# Patient Record
Sex: MALE | Race: WHITE | URBAN - METROPOLITAN AREA
[De-identification: names, ages, dates, MRNs, and addresses within clinical notes are randomized per-mention and may not be internally consistent; named-entity substitution may affect disease eponyms.]

---

## 2019-09-14 ENCOUNTER — HOSPITAL ENCOUNTER (INPATIENT)
Age: 39
LOS: 4 days | Discharge: HOME | DRG: 885 | End: 2019-09-18
Attending: PSYCHIATRY & NEUROLOGY | Admitting: PSYCHIATRY & NEUROLOGY
Payer: MEDICARE

## 2019-09-14 PROBLEM — F31.9 BIPOLAR 1 DISORDER (HCC): Status: ACTIVE | Noted: 2019-09-14

## 2019-09-14 PROCEDURE — 1240000000 HC EMOTIONAL WELLNESS R&B

## 2019-09-14 PROCEDURE — 99222 1ST HOSP IP/OBS MODERATE 55: CPT | Performed by: INTERNAL MEDICINE

## 2019-09-14 RX ORDER — ACETAMINOPHEN 325 MG/1
650 TABLET ORAL EVERY 4 HOURS PRN
Status: DISCONTINUED | OUTPATIENT
Start: 2019-09-14 | End: 2019-09-18 | Stop reason: HOSPADM

## 2019-09-14 RX ORDER — PREDNISONE 20 MG/1
20 TABLET ORAL DAILY
Status: DISCONTINUED | OUTPATIENT
Start: 2019-09-15 | End: 2019-09-18 | Stop reason: HOSPADM

## 2019-09-14 RX ORDER — BENZTROPINE MESYLATE 1 MG/ML
2 INJECTION INTRAMUSCULAR; INTRAVENOUS DAILY PRN
Status: DISCONTINUED | OUTPATIENT
Start: 2019-09-14 | End: 2019-09-18 | Stop reason: HOSPADM

## 2019-09-14 RX ORDER — CLINDAMYCIN HYDROCHLORIDE 150 MG/1
300 CAPSULE ORAL EVERY 8 HOURS SCHEDULED
Status: DISCONTINUED | OUTPATIENT
Start: 2019-09-14 | End: 2019-09-18 | Stop reason: HOSPADM

## 2019-09-14 RX ORDER — HYDROXYZINE HYDROCHLORIDE 25 MG/1
25 TABLET, FILM COATED ORAL 3 TIMES DAILY PRN
Status: DISCONTINUED | OUTPATIENT
Start: 2019-09-14 | End: 2019-09-18 | Stop reason: HOSPADM

## 2019-09-14 RX ORDER — DIPHENHYDRAMINE HYDROCHLORIDE, ZINC ACETATE 2; .1 G/100G; G/100G
CREAM TOPICAL 3 TIMES DAILY PRN
Status: DISCONTINUED | OUTPATIENT
Start: 2019-09-14 | End: 2019-09-18 | Stop reason: HOSPADM

## 2019-09-14 RX ORDER — TRAZODONE HYDROCHLORIDE 50 MG/1
50 TABLET ORAL NIGHTLY PRN
Status: DISCONTINUED | OUTPATIENT
Start: 2019-09-14 | End: 2019-09-18 | Stop reason: HOSPADM

## 2019-09-14 RX ORDER — MAGNESIUM HYDROXIDE/ALUMINUM HYDROXICE/SIMETHICONE 120; 1200; 1200 MG/30ML; MG/30ML; MG/30ML
30 SUSPENSION ORAL 3 TIMES DAILY PRN
Status: DISCONTINUED | OUTPATIENT
Start: 2019-09-14 | End: 2019-09-18 | Stop reason: HOSPADM

## 2019-09-14 ASSESSMENT — PAIN DESCRIPTION - PAIN TYPE: TYPE: ACUTE PAIN

## 2019-09-14 ASSESSMENT — SLEEP AND FATIGUE QUESTIONNAIRES
DIFFICULTY FALLING ASLEEP: YES
SLEEP PATTERN: DIFFICULTY FALLING ASLEEP;DISTURBED/INTERRUPTED SLEEP;RESTLESSNESS
RESTFUL SLEEP: NO
AVERAGE NUMBER OF SLEEP HOURS: 4
DIFFICULTY STAYING ASLEEP: YES
DO YOU USE A SLEEP AID: NO
DO YOU HAVE DIFFICULTY SLEEPING: YES
DIFFICULTY ARISING: NO

## 2019-09-14 ASSESSMENT — PAIN SCALES - GENERAL: PAINLEVEL_OUTOF10: 6

## 2019-09-14 ASSESSMENT — PAIN DESCRIPTION - LOCATION: LOCATION: FOOT

## 2019-09-14 ASSESSMENT — PAIN DESCRIPTION - PROGRESSION: CLINICAL_PROGRESSION: NOT CHANGED

## 2019-09-14 ASSESSMENT — PAIN DESCRIPTION - ORIENTATION: ORIENTATION: RIGHT;LEFT

## 2019-09-14 ASSESSMENT — PAIN DESCRIPTION - ONSET: ONSET: ON-GOING

## 2019-09-14 ASSESSMENT — LIFESTYLE VARIABLES: HISTORY_ALCOHOL_USE: NO

## 2019-09-14 ASSESSMENT — PAIN DESCRIPTION - FREQUENCY: FREQUENCY: CONTINUOUS

## 2019-09-14 ASSESSMENT — PAIN - FUNCTIONAL ASSESSMENT: PAIN_FUNCTIONAL_ASSESSMENT: PREVENTS OR INTERFERES SOME ACTIVE ACTIVITIES AND ADLS

## 2019-09-14 ASSESSMENT — PAIN DESCRIPTION - DESCRIPTORS: DESCRIPTORS: BURNING;ACHING

## 2019-09-15 LAB
ABSOLUTE EOS #: 0.3 K/UL (ref 0–0.4)
ABSOLUTE IMMATURE GRANULOCYTE: ABNORMAL K/UL (ref 0–0.3)
ABSOLUTE LYMPH #: 1.6 K/UL (ref 1–4.8)
ABSOLUTE MONO #: 0.6 K/UL (ref 0.1–1.3)
ALBUMIN SERPL-MCNC: 3.5 G/DL (ref 3.5–5.2)
ALBUMIN/GLOBULIN RATIO: ABNORMAL (ref 1–2.5)
ALP BLD-CCNC: 67 U/L (ref 40–129)
ALT SERPL-CCNC: 211 U/L (ref 5–41)
ANION GAP SERPL CALCULATED.3IONS-SCNC: 8 MMOL/L (ref 9–17)
AST SERPL-CCNC: 191 U/L
BASOPHILS # BLD: 1 % (ref 0–2)
BASOPHILS ABSOLUTE: 0 K/UL (ref 0–0.2)
BILIRUB SERPL-MCNC: 0.64 MG/DL (ref 0.3–1.2)
BUN BLDV-MCNC: 16 MG/DL (ref 6–20)
BUN/CREAT BLD: ABNORMAL (ref 9–20)
CALCIUM SERPL-MCNC: 8.8 MG/DL (ref 8.6–10.4)
CHLORIDE BLD-SCNC: 100 MMOL/L (ref 98–107)
CO2: 25 MMOL/L (ref 20–31)
CREAT SERPL-MCNC: 0.68 MG/DL (ref 0.7–1.2)
DIFFERENTIAL TYPE: ABNORMAL
EOSINOPHILS RELATIVE PERCENT: 7 % (ref 0–4)
GFR AFRICAN AMERICAN: >60 ML/MIN
GFR NON-AFRICAN AMERICAN: >60 ML/MIN
GFR SERPL CREATININE-BSD FRML MDRD: ABNORMAL ML/MIN/{1.73_M2}
GFR SERPL CREATININE-BSD FRML MDRD: ABNORMAL ML/MIN/{1.73_M2}
GLUCOSE BLD-MCNC: 187 MG/DL (ref 70–99)
HCT VFR BLD CALC: 40.3 % (ref 41–53)
HEMOGLOBIN: 13.6 G/DL (ref 13.5–17.5)
IMMATURE GRANULOCYTES: ABNORMAL %
LYMPHOCYTES # BLD: 31 % (ref 24–44)
MCH RBC QN AUTO: 30.1 PG (ref 26–34)
MCHC RBC AUTO-ENTMCNC: 33.8 G/DL (ref 31–37)
MCV RBC AUTO: 89.2 FL (ref 80–100)
MONOCYTES # BLD: 13 % (ref 1–7)
NRBC AUTOMATED: ABNORMAL PER 100 WBC
PDW BLD-RTO: 13.6 % (ref 11.5–14.9)
PLATELET # BLD: 114 K/UL (ref 150–450)
PLATELET ESTIMATE: ABNORMAL
PMV BLD AUTO: 8.7 FL (ref 6–12)
POTASSIUM SERPL-SCNC: 4 MMOL/L (ref 3.7–5.3)
RBC # BLD: 4.52 M/UL (ref 4.5–5.9)
RBC # BLD: ABNORMAL 10*6/UL
SEG NEUTROPHILS: 48 % (ref 36–66)
SEGMENTED NEUTROPHILS ABSOLUTE COUNT: 2.5 K/UL (ref 1.3–9.1)
SODIUM BLD-SCNC: 133 MMOL/L (ref 135–144)
TOTAL PROTEIN: 6.5 G/DL (ref 6.4–8.3)
WBC # BLD: 5.1 K/UL (ref 3.5–11)
WBC # BLD: ABNORMAL 10*3/UL

## 2019-09-15 PROCEDURE — 36415 COLL VENOUS BLD VENIPUNCTURE: CPT

## 2019-09-15 PROCEDURE — 6370000000 HC RX 637 (ALT 250 FOR IP): Performed by: INTERNAL MEDICINE

## 2019-09-15 PROCEDURE — 1240000000 HC EMOTIONAL WELLNESS R&B

## 2019-09-15 PROCEDURE — 85025 COMPLETE CBC W/AUTO DIFF WBC: CPT

## 2019-09-15 PROCEDURE — 80053 COMPREHEN METABOLIC PANEL: CPT

## 2019-09-15 PROCEDURE — 99231 SBSQ HOSP IP/OBS SF/LOW 25: CPT | Performed by: INTERNAL MEDICINE

## 2019-09-15 RX ADMIN — CLINDAMYCIN HYDROCHLORIDE 300 MG: 150 CAPSULE ORAL at 10:47

## 2019-09-15 RX ADMIN — CLINDAMYCIN HYDROCHLORIDE 300 MG: 150 CAPSULE ORAL at 21:04

## 2019-09-15 RX ADMIN — PREDNISONE 20 MG: 20 TABLET ORAL at 10:47

## 2019-09-15 RX ADMIN — CLINDAMYCIN HYDROCHLORIDE 300 MG: 150 CAPSULE ORAL at 16:00

## 2019-09-15 ASSESSMENT — SLEEP AND FATIGUE QUESTIONNAIRES
DIFFICULTY FALLING ASLEEP: YES
DO YOU HAVE DIFFICULTY SLEEPING: YES
DIFFICULTY ARISING: NO
RESTFUL SLEEP: NO
SLEEP PATTERN: INSOMNIA;DIFFICULTY FALLING ASLEEP;RESTLESSNESS;DISTURBED/INTERRUPTED SLEEP
DO YOU USE A SLEEP AID: NO
AVERAGE NUMBER OF SLEEP HOURS: 3
DIFFICULTY STAYING ASLEEP: YES

## 2019-09-15 ASSESSMENT — PAIN SCALES - GENERAL: PAINLEVEL_OUTOF10: 0

## 2019-09-15 ASSESSMENT — LIFESTYLE VARIABLES: HISTORY_ALCOHOL_USE: NO

## 2019-09-15 NOTE — CARE COORDINATION
BHI Biopsychosocial Assessment    Current Level of Psychosocial Functioning     Independent   Dependent   X  Minimal Assist     Psychosocial High Risk Factors   Unable to obtain meds   Chronic illness/pain    Substance abuse  X - tested positive for amphetamine in system ( patient reports he smokes marijuana daily)  Lack of Family Support X  Financial stress X  Isolation  X  Inadequate Community Resources  Suicide attempt(s)  Not taking medications  X  Victim of crime   Developmental Delay  Unable to manage personal needs  X  Age 73 or older   Homeless X  No transportation  X  Readmission within 30 days  Unemployment X  Traumatic Event X    Psychiatric Advanced Directives: unknown     Family to Involve in Treatment: unknown patient reports mother but no answer at number provided    Sexual Orientation:  heterosexual    Patient Strengths: patient cannot identify any - patient has SSI income  (referred to HELP program)    Patient Barriers: AOD use, homeless, no support system, estranged from family, no insurance, no transportation, not compliant with Counts include 234 beds at the [facility]     Opiate/AOD Education Provided:  Provided patient AOD info, he denies but reports he smokes marijuana daily    CMHC/mental health history: hx of [facility] 2017, Counts include 234 beds at the [facility] 2019, other inpatient / outpatient psych admits and with ED visits    Plan of Care   medication management, group/individual therapies, family meetings, psycho -education, treatment team meetings to assist with stabilization    Initial Discharge Plan:  Link back to [place] and identify safe stable housing or shelter ( patient may live alone but unknown at this time patient is still delusional and not reality based at times with his thinking)    Clinical Summary:   Patient is a  46year old  male admitted to the [facility] on a pink from [facility] after patient was picked up wandering around with no pants or underwear on and trying to get into a house that he stated to

## 2019-09-16 LAB
HAV IGM SER IA-ACNC: NONREACTIVE
HEPATITIS B CORE IGM ANTIBODY: NONREACTIVE
HEPATITIS B SURFACE ANTIGEN: NONREACTIVE
HEPATITIS C ANTIBODY: REACTIVE
HIV AG/AB: NONREACTIVE

## 2019-09-16 PROCEDURE — 1240000000 HC EMOTIONAL WELLNESS R&B

## 2019-09-16 PROCEDURE — 87389 HIV-1 AG W/HIV-1&-2 AB AG IA: CPT

## 2019-09-16 PROCEDURE — 36415 COLL VENOUS BLD VENIPUNCTURE: CPT

## 2019-09-16 PROCEDURE — 80074 ACUTE HEPATITIS PANEL: CPT

## 2019-09-16 PROCEDURE — 6370000000 HC RX 637 (ALT 250 FOR IP): Performed by: INTERNAL MEDICINE

## 2019-09-16 RX ADMIN — CLINDAMYCIN HYDROCHLORIDE 300 MG: 150 CAPSULE ORAL at 06:27

## 2019-09-16 RX ADMIN — PREDNISONE 20 MG: 20 TABLET ORAL at 11:27

## 2019-09-16 NOTE — PLAN OF CARE
Patient has been out in the milieu aloof of peers. Patient was compliant with scheduled medications this evening. Patient has flat affect and has thought blocking. Patient denies any hallucinations. Patient denies any suicidal thoughts at this time. Patient agrees to come talk with staff if having any thoughts to harm himself this shift. 15 min rounds continued for patient safety.

## 2019-09-16 NOTE — GROUP NOTE
Group Therapy Note    Date: September 16    Group Start Time: 0900  Group End Time: 4630  Group Topic: Community Meeting    [address], [place]        Group Therapy Note    Attendees: 9/20    Pt did not participate  In community meeting and goal setting at [phone] despite staff encouragement and prompts.

## 2019-09-17 PROBLEM — F32.A DEPRESSIVE DISORDER: Status: ACTIVE | Noted: 2019-09-14

## 2019-09-17 PROCEDURE — 6370000000 HC RX 637 (ALT 250 FOR IP): Performed by: INTERNAL MEDICINE

## 2019-09-17 PROCEDURE — 99222 1ST HOSP IP/OBS MODERATE 55: CPT | Performed by: INTERNAL MEDICINE

## 2019-09-17 PROCEDURE — 1240000000 HC EMOTIONAL WELLNESS R&B

## 2019-09-17 PROCEDURE — 36415 COLL VENOUS BLD VENIPUNCTURE: CPT

## 2019-09-17 PROCEDURE — 87522 HEPATITIS C REVRS TRNSCRPJ: CPT

## 2019-09-17 RX ORDER — CLINDAMYCIN HYDROCHLORIDE 300 MG/1
300 CAPSULE ORAL EVERY 8 HOURS SCHEDULED
Qty: 12 CAPSULE | Refills: 0 | Status: SHIPPED | OUTPATIENT
Start: 2019-09-17 | End: 2019-09-21

## 2019-09-17 RX ORDER — DIPHENHYDRAMINE HYDROCHLORIDE, ZINC ACETATE 2; .1 G/100G; G/100G
CREAM TOPICAL
Qty: 1 TUBE | Refills: 0 | Status: SHIPPED | OUTPATIENT
Start: 2019-09-17

## 2019-09-17 RX ORDER — PREDNISONE 20 MG/1
20 TABLET ORAL DAILY
Qty: 1 TABLET | Refills: 0 | Status: SHIPPED | OUTPATIENT
Start: 2019-09-18 | End: 2019-09-19

## 2019-09-17 RX ADMIN — CLINDAMYCIN HYDROCHLORIDE 300 MG: 150 CAPSULE ORAL at 12:47

## 2019-09-17 RX ADMIN — CLINDAMYCIN HYDROCHLORIDE 300 MG: 150 CAPSULE ORAL at 06:23

## 2019-09-17 RX ADMIN — PREDNISONE 20 MG: 20 TABLET ORAL at 12:47

## 2019-09-17 RX ADMIN — CLINDAMYCIN HYDROCHLORIDE 300 MG: 150 CAPSULE ORAL at 21:46

## 2019-09-17 NOTE — CONSULTS
GI Consult Note:    Name: Fazal Batista  MRN: [id number]     Acct: [de-identified]  Room: [address] [place] [address]    Admit Date: 9/14/2019  PCP: No primary care provider on file. Physician Requesting Consult: Rita Stevens MD     Reason for Consult: Hepatitis C  Elevated liver enzymes  Abdominal pains  Acid reflux    Chief Complaint:     No chief complaint on file. History Obtained From:     Patient and EMR    History of Present Illness:      Fazal Batista is a  46 y.o.  male who presents with No chief complaint on file. This patient is admitted to psych unit with history for suicidal ideation history for depression  He has been found to have elevated liver enzymes  Otitis C screen is positive  Mixed using IV drugs narcotic pain medicines alcohol as well as marijuana  He is currently on Suboxone  Patient has history for abdominal pain bloating cramping  History for heartburn's occasional trouble swallowing food? Denies any bleeding denies any melanotic stools  Some issues with constipation as well.ibss  Patient has been complaining of some abdominal pains, off and on cramping  Also complains of abdominal bloating and gas  Has off and on nausea without any sig vomiting  Has some alternating constipation and diarrhea  Has no weight loss  Has some anxiety issues      Symptoms:  Onset:  Location:  abdomen  Duration:  day(s)  Severity:  mild, moderate  Quality:  intermittent      Past Medical History:     Past Medical History:   Diagnosis Date    ADHD (attention deficit hyperactivity disorder)     Depression         Past Surgical History:     Past Surgical History:   Procedure Laterality Date    MANDIBLE FRACTURE SURGERY          Medications Prior to Admission:       Prior to Admission medications    Medication Sig Start Date End Date Taking?  Authorizing Provider   PARoxetine (PAXIL) 30 MG tablet Take 1 tablet by mouth daily 1/18/16   Villa Essex, MD        Allergies:       Patient has no known allergies. Social History:     Tobacco:    reports that he has been smoking. He has been smoking about 1.00 pack per day. He has never used smokeless tobacco.  Alcohol:      has no alcohol history on file. Drug Use:  has no drug history on file.     Family History:     Family History   Problem Relation Age of Onset    Depression Mother        Review of Systems:     Positive and Negative as described in HPI    Constitutional:  negative for  fevers, chills, sweats, positive fatigue, and weight loss  HEENT:  negative for vision or hearing changes,   Respiratory:  negative for shortness of breath, cough, or congestion  Cardiovascular:  negative for  chest pain, palpitations  Gastrointestinal:  negative for nausea, vomiting, diarrhea, constipation, positive abdominal pain  Genitourinary:  negative for frequency, dysuria  Integument: Positive rash, skin lesions  Musculoskeletal: Positive for muscle aches or joint pain  Neurological: Positive for headaches, dizziness, lightheadedness, numbness, pain and tingling extrimities  Behavior/Psych: Positive for depression and anxiety    Code Status:  Full Code    Physical Exam:     Vitals:  /68   Pulse 78   Temp 98.2 °F (36.8 °C) (Oral)   Resp 14   Ht 6' 3\" (1.905 m)   Wt 230 lb (104.3 kg)   SpO2 97%   BMI 28.75 kg/m²   Temp (24hrs), Av.3 °F (36.8 °C), Min:98.2 °F (36.8 °C), Max:98.4 °F (36.9 °C)      General appearance - alert, well appearing, and in no acute distress  Mental status - oriented to person, place, and time with shoes affect  Head - normocephalic and atraumatic  Eyes - pupils equal and reactive, extraocular eye movements intact, conjunctiva clear  Ears - hearing appears to be intact  Nose - no drainage noted  Mouth - mucous membranes dry  Neck - supple, no carotid bruits, thyroid not palpable  Chest - clear to auscultation, normal effort  Heart - normal rate, regular rhythm, no murmurs  Abdomen - soft, abdominal tenderness, nondistended, bowel

## 2019-09-17 NOTE — PLAN OF CARE
Problem: Altered Mood, Deterioration in Function:  Goal: Ability to perform activities of daily living will improve  Outcome: Ongoing     Problem: Altered Mood, Deterioration in Function:  Goal: Able to verbalize reality based thinking  Outcome: Ongoing     Problem: Pain:  Description  Pain management should include both nonpharmacologic and pharmacologic interventions. Goal: Control of chronic pain  Outcome: Ongoing     Patient is medication compliant and in behavioral control. Patient has attended groups and been social with peers in the common areas of the unit. Patient denies suicidal and homicidal ideation. Patient denies auditory and visual hallucinations. Patient has engaged in ADL's. Patient has consumed meals and snacks this shift. Patient reports improved sleep. Patient has remained free from harm. Every 15 minute and spontaneous safety checks have been maintained.

## 2019-09-18 ENCOUNTER — APPOINTMENT (OUTPATIENT)
Dept: ULTRASOUND IMAGING | Age: 39
DRG: 885 | End: 2019-09-18
Attending: PSYCHIATRY & NEUROLOGY
Payer: MEDICARE

## 2019-09-18 VITALS
BODY MASS INDEX: 28.6 KG/M2 | HEART RATE: 82 BPM | TEMPERATURE: 97.5 F | WEIGHT: 230 LBS | RESPIRATION RATE: 14 BRPM | SYSTOLIC BLOOD PRESSURE: 128 MMHG | HEIGHT: 75 IN | OXYGEN SATURATION: 97 % | DIASTOLIC BLOOD PRESSURE: 82 MMHG

## 2019-09-18 PROCEDURE — 5130000000 HC BRIDGE APPOINTMENT: Performed by: COUNSELOR

## 2019-09-18 PROCEDURE — 6370000000 HC RX 637 (ALT 250 FOR IP): Performed by: INTERNAL MEDICINE

## 2019-09-18 RX ADMIN — PREDNISONE 20 MG: 20 TABLET ORAL at 08:26

## 2019-09-18 RX ADMIN — CLINDAMYCIN HYDROCHLORIDE 300 MG: 150 CAPSULE ORAL at 05:43

## 2019-09-18 NOTE — BH NOTE
[facility]  Discharge Note    Pt discharged with followings belongings:   Dentures: None  Vision - Corrective Lenses: None  Hearing Aid: None  Jewelry: None  Body Piercings Removed: N/A  Clothing: Shirt  Were All Patient Medications Collected?: Not Applicable  Other Valuables: None   Valuables retrieved from safe, Security envelope number:  [id number] and returned to patient. Patient education on aftercare instructions: yes  Information faxed to [facility] by nurse Patient verbalize understanding of AVS:  Yes. Patient left ambulatory via cab to private residence.     Status EXAM upon discharge:  Status and Exam  Normal: No  Facial Expression: Avoids Gaze  Affect: Appropriate, Stable  Level of Consciousness: Alert  Mood:Normal: No  Mood: Anxious, Depressed  Motor Activity:Normal: No  Motor Activity: Decreased  Interview Behavior: Cooperative  Preception: [place] to Person, [place] Dynes to Time, [place] to Place, [place] to Situation  Attention:Normal: No  Attention: Distractible, Unable to Concentrate  Thought Processes: Blocking  Thought Content:Normal: Yes  Thought Content: Poverty of Content  Hallucinations: None  Delusions: No  Memory:Normal: Yes  Memory: Poor Recent, Poor Remote  Insight and Judgment: No  Insight and Judgment: Poor Judgment, Poor Insight  Present Suicidal Ideation: No  Present Homicidal Ideation: No      Metabolic Screening:    No results found for: LABA1C    No results found for: CHOL  No results found for: TRIG  No results found for: HDL  No components found for: LDLCAL  No results found for: Ortega Mc RN
Patient refused to attend Smoking cessation group at 1600 after encouragement from staff. 1:1 talk time offered as alternative to group session.
Patient refused to attend Wellness group at 1100 after encouragement from staff. 1:1 talk time was offered as alternative to group session.
Pt leisure assessment will be completed 9/14/2019.
assessments and care planning    Outcome: needs reinforcement    PATIENT GOALS: to be discussed with patient within 72 hours    PLAN/TREATMENT RECOMMENDATIONS:     continue group therapy , medications compliance, goal setting, individualized assessments and care, continue to monitor pt on unit      SHORT-TERM GOALS:   Time frame for Short-Term Goals: 5-7 days    LONG-TERM GOALS:  Time frame for Long-Term Goals: 6 months  Members Present in Team Meeting: See Signature Sheet    [place]
answer all direct questions when asked. Patient safety maintained.                    Sana Dodge RN

## 2019-09-18 NOTE — GROUP NOTE
Group Therapy Note    Date: September 18    Group Start Time: 0900  Group End Time: 0930  Group Topic: Community Meeting    [address] [place] [address]        Group Therapy Note    Attendees: 8/18    Pt did not participate  In community meeting and goal setting  at [phone] despite staff encouragement and prompts.

## 2019-09-18 NOTE — CARE COORDINATION
Spoke with ST QUESADA from [employer] and she stated AM PM cab to pick patient up between 130-2 PM today, information provided to nurse Chaim Escobar.

## 2019-09-20 LAB
DIRECT EXAM: ABNORMAL
DIRECT EXAM: ABNORMAL
Lab: ABNORMAL
SPECIMEN DESCRIPTION: ABNORMAL

## 2019-10-23 NOTE — PROGRESS NOTES
CLINICAL PHARMACY NOTE: MEDS TO [address] Select Patient?: No  Total # of Prescriptions Filled: 2   The following medications were delivered to the patient:  · Prednisone  · Clindamycin  ·   Total # of Interventions Completed: 0  Time Spent (min): 30    Additional Documentation:
Psychiatric Admission Note         Jaiden Blanchard is a 46 y.o. male who [facility] where he was sent from [facility]. Reportedly, patient was parked, unclosed, displaying disorganized thought process. When police tried to intervene, patient began yelling and ranting about leeches, displaying disorganized thinking, they took him to the hospital and put him on a pink slip. The initial concern was the patient had been abusing stimulants. Patient is not willing to discuss drug use with me. Denies any memory of events prior to hospitalization. Today, thought process does seem more improved and organized. He denies any history of primary psychotic symptoms. Denies any recent mental health treatment. Denies any pervasive mood symptoms at this time. Allergies:  Patient has no known allergies. History of Substance Abuse     There was some initial concern for stimulant abuse. Patient denies to me any drug use. Past Psychiatric History     Patient Reports he is not currently linked with any mental health care. . Reported history of psychiatric inpatient hospitalizations. Denied history of suicide attempts. Family History of psychiatric disorders    Family history: positive for depression      Medical History     Past Medical History:   Diagnosis Date    ADHD (attention deficit hyperactivity disorder)     Depression         Mental Status  Pt. was alert, fully oriented, and cooperative. Appearance and hygiene weredisheveled, poor hygiene . Mood was euthymic. Affect was euthymic and appropriately reactive Thought process was demonstrative of poverty of thought and thought blocking. Patient denied any hallucinations or paranoia. Patient denied suicidal ideations. Patient denied homicidal ideations . Patient's gross cognitive functions were intact. Insight and judgement were poor. Both recent and remote memory were impaired.     Psychomotor status
days  Ask about: Should I take this medication?     predniSONE 20 MG tablet  Commonly known as:  DELTASONE  Take 1 tablet by mouth daily for 1 day  Ask about: Should I take this medication?            Where to Get Your Medications      [facility] [address] [facility] [address]    Phone:  [phone]   · clindamycin 300 MG capsule  · diphenhydrAMINE-zinc acetate 2-0.1 % cream  · predniSONE 20 MG tablet         Disposition: Home    Discharge Date: 9/18/2019

## 2019-10-23 NOTE — DISCHARGE SUMMARY
Presenting Evaluation:  Harshil Vila is a 46 y.o. male who [facility] slip where he was sent from [facility]. Reportedly, patient was parked, unclosed, displaying disorganized thought process. When police tried to intervene, patient began yelling and ranting about leeches, displaying disorganized thinking, they took him to the hospital and put him on a pink slip. The initial concern was the patient had been abusing stimulants. Patient is not willing to discuss drug use with me. Denies any memory of events prior to hospitalization. Today, thought process does seem more improved and organized. He denies any history of primary psychotic symptoms. Denies any recent mental health treatment. Denies any pervasive mood symptoms at this time. 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 Amphetamine abuse       After discussion with patient about potential risks, benefits, side effects, decided to not start any psychiatric medication. Psychosis cleared. He was doing fairly well at the time of discharge and was not in any distress. Thought process was organized and showed insight into compliance with treatment. Patient had been making rational and realistic plans so he was discharged. Patient had been bright, reactive, and interacting appropriately with staff and peers. There had been multiple consecutive days without any safety concerns. . He will follow up at [facility]. Medication List      START taking these medications    diphenhydrAMINE-zinc acetate 2-0.1 % cream  Apply topically 3 times daily as needed.   Notes to patient:  Anti-itch medication        STOP taking these medications    PARoxetine 30 MG tablet  Commonly known as:  PAXIL        ASK your doctor about these medications    clindamycin 300 MG capsule  Commonly known as:  CLEOCIN  Take 1 capsule by mouth every 8 hours for 4